# Patient Record
Sex: MALE | Race: AMERICAN INDIAN OR ALASKA NATIVE
[De-identification: names, ages, dates, MRNs, and addresses within clinical notes are randomized per-mention and may not be internally consistent; named-entity substitution may affect disease eponyms.]

---

## 2020-02-24 ENCOUNTER — HOSPITAL ENCOUNTER (OUTPATIENT)
Dept: HOSPITAL 5 - CT | Age: 30
Discharge: HOME | End: 2020-02-24
Attending: UROLOGY
Payer: COMMERCIAL

## 2020-02-24 DIAGNOSIS — N39.0: Primary | ICD-10-CM

## 2020-02-24 DIAGNOSIS — R30.0: ICD-10-CM

## 2020-02-24 PROCEDURE — 74176 CT ABD & PELVIS W/O CONTRAST: CPT

## 2020-02-24 NOTE — CAT SCAN REPORT
CT ABDOMEN AND PELVIS WITHOUT CONTRAST



HISTORY: URINARY TRACT INFECTION



COMPARISON: None.



TECHNIQUE: Axial CT images were obtained through the abdomen and pelvis without IV contrast. Sagittal
 and coronal reformatted images. All CT scans at this location are performed using CT dose reduction 
for ALARA by means of automated exposure control.





FINDINGS:



CT ABDOMEN:

Lung Bases: Clear.

Liver: No significant abnormality.

Biliary: No significant abnormality.

Spleen: No significant abnormality.  Unenlarged.

Pancreas: No significant abnormality.

Adrenals: No significant abnormality.

Kidneys: No significant abnormality.

Lymphatics: No lymphadenopathy.

Vasculature: No significant abnormality. 

Bowel/Peritoneum: No significant abnormality. No free air. No free fluid. The appendix is not identif
ied.



CT PELVIS:

: No significant abnormality.



Osseous Structures: No significant abnormality.

Additional Findings: None



IMPRESSION:

No significant abnormality.



Signer Name: Malcolm Bustillos Jr, MD 

Signed: 2/24/2020 9:29 AM

 Workstation Name: [id number]

## 2021-01-25 NOTE — EMERGENCY DEPARTMENT REPORT
Upper Respiratory HPI





- HPI


Chief Complaint: Upper Respiratory Infection


Stated Complaint: SINUS INFECTION


Time Seen by Provider: 01/25/21 14:48


Duration: 1 week


URI Symptoms: Rhinorrhea: Yes, Sore Throat: No, Ear Pain: No, Cough: No, 

Shortness of Breath: No, Sick Contacts: No, Unable to Take Fluids: No, Urine 

Output Abnormal: No, Listless Behavior: No


Other History: This is a 30-year-old male nontoxic, well nourished in 

appearance, no acute signs of distress presents to the ED with c/o of sinus 

pain, rhinorrhea, nasal congestion x1 week.  Patient patient denies any cough.  

Patient denies any other symptoms.  Denies any sick contacts..  Patient denies 

any recent travels, long car, recent hospital stays.  Patient denies any calf 

pain or calf tenderness.  Patient denies any chest pain, short of breath, fever,

chills, nausea, vomiting, hemoptysis, numbness, tingling, headache or stiff 

neck.  Patient denies any allergies or significant past medical history.





- Home Meds and Allergies


Home Medications: 


                                  Previous Rx's











 Medication  Instructions  Recorded  Last Taken  Type


 


Amoxicillin/K Clav Tab [Augmentin 1 tab PO Q12HR #20 tab 01/25/21 Unknown Rx





875 mg]    











Allergies/Adverse Reactions: 


                                    Allergies











Allergy/AdvReac Type Severity Reaction Status Date / Time


 


No Known Allergies Allergy   Unverified 02/24/20 08:10














ED Review of Systems


ROS: 


Stated complaint: SINUS INFECTION


Other details as noted in HPI





Constitutional: denies: chills, fever


Eyes: denies: eye pain, eye discharge, vision change


ENT: congestion.  denies: ear pain, throat pain


Respiratory: cough.  denies: shortness of breath, wheezing


Cardiovascular: denies: chest pain, palpitations


Endocrine: no symptoms reported


Gastrointestinal: denies: abdominal pain, nausea, diarrhea


Genitourinary: denies: urgency, dysuria


Musculoskeletal: denies: back pain, joint swelling, arthralgia


Skin: denies: rash, lesions


Neurological: denies: headache, weakness, paresthesias


Psychiatric: denies: anxiety, depression


Hematological/Lymphatic: denies: easy bleeding, easy bruising





ED Past Medical Hx





- Past Medical History


Previous Medical History?: No





- Surgical History


Past Surgical History?: Yes


Hx Appendectomy: Yes





- Medications


Home Medications: 


                                Home Medications











 Medication  Instructions  Recorded  Confirmed  Last Taken  Type


 


Amoxicillin/K Clav Tab [Augmentin 1 tab PO Q12HR #20 tab 01/25/21  Unknown Rx





875 mg]     














ED Bronchiolitis Physical Exam





- Exam


General: 


Vital signs noted. No distress. Alert and acting appropriately.





Positive frontal sinus tenderness.


HEENT: No Pharyngeal Erythema, No Conjuctival Injection, No Dry Mucous 

Membranes, No Rhinorrhea


Ear: Neither TM Bulge, Neither TM Erythema, Neither EAC Discharge


Neck: No Adenopathy, No Rigidity


Lungs: Yes Clear Lung Sounds, Yes Good Air Exchange, No Wheezes, No Stridor, No 

Cough, No Nasal Flaring, No Retractions, No Use of Accessory Muscles


Heart: Yes Regular, No Murmur


Abdomen: Yes Normal Bowel Sounds, No Tenderness, No Peritoneal Signs


Skin: No Rash, No Eczema


Neurologic: 


Alert and oriented, no deficits.








Musculoskeletal: 


Unremarkable.











ED Bronchiolitis Tests





- Testing


Testing: CXR: Normal/Negative





ED Physical Exam





- General


Limitations: No Limitations





ED Course


                                   Vital Signs











  01/25/21





  14:52


 


Temperature 99.1 F


 


Pulse Rate 96 H


 


Respiratory 16





Rate 


 


Blood Pressure 124/80





[Right] 


 


O2 Sat by Pulse 99





Oximetry 














- Reevaluation(s)


Reevaluation #1: 





01/25/21 16:09


Patient is speaking in full sentences with no signs of distress noted.





ED Medical Decision Making





- Medical Decision Making





This is a 30-year-old male that presents with sinusitis.  Patient is stable and 

was examined by me.  Patient is notified of x-ray results with no questions 

noted. Patient does not meet clinical concerns of COVID-19 but patient was 

instructed and educated on signs and symptoms and to self quarantine and seek 

medical attention as soon as possible if symptoms does occur.  Patient be 

treated with Augmentin at discharge.   Patient was instructed to increase 

hydration, rest and take Tylenol for fever episodes.  Vitals stable. Patient is 

nonfebrile and normal heart rate. Patient was instructed Follow-up with a 

primary care doctor in 3-5 days or if symptoms worsen and continue return to 

emergency room as soon as possible.  At time time of discharge, the patient does

 not seem toxic or ill in appearance.  No acute signs of distress noted.  

Patient agrees to discharge treatment plan of care.  No further questions noted 

by the patient.nt.


Critical care attestation.: 


If time is entered above; I have spent that time in minutes in the direct care 

of this critically ill patient, excluding procedure time.








ED Disposition


Clinical Impression: 


Sinusitis


Qualifiers:


 Sinusitis location: frontal Chronicity: acute Recurrence: non-recurrent 

Qualified Code(s): J01.10 - Acute frontal sinusitis, unspecified





Disposition: DC-01 TO HOME OR SELFCARE


Is pt being admited?: No


Does the pt Need Aspirin: No


Condition: Stable


Instructions:  Sinusitis, Adult, Easy-to-Read


Additional Instructions: 


Follow-up with a primary care doctor in 3-5 days or if symptoms worsen and 

continue return to emergency room as soon as possible. 


Prescriptions: 


Amoxicillin/K Clav Tab [Augmentin 875 mg] 1 tab PO Q12HR #20 tab


Referrals: 


PRIMARY CARE,MD [Referring] - 3-5 Days


OG GALLEGOS MD [Staff Physician] - 3-5 Days


Forms:  Work/School Release Form(ED)


Time of Disposition: 16:15